# Patient Record
(demographics unavailable — no encounter records)

---

## 2017-10-25 NOTE — CT
BRAIN CT WITHOUT IV CONTRAST:

 

HISTORY:

A 35-year-old female with headache.

 

COMPARISON:

No old studies.

 

FINDINGS:

The patient has a right-sided ventriculoperitoneal shunt tube in place.  There is no focal mass or m
idline shift.  No intraaxial or extraaxial hemorrhage.  There is enlargement of the sella turcica wi
th associated low attenuation change, probably related to empty sella.

 

IMPRESSION:

1.  Right ventriculoperitoneal shunt tube.

 

2.  Fairly marked volume loss involving the posterior fossa and particularly the left cerebellum.

 

3.  Enlarged sella turcica with low attenuation change, evidence for an empty sella.

 

4.  No mass, bleed, or other acute process.

 

5.  The sinuses and mastoids are clear of acute process.

 

POS: [facility]